# Patient Record
Sex: FEMALE | Race: OTHER | HISPANIC OR LATINO | URBAN - METROPOLITAN AREA
[De-identification: names, ages, dates, MRNs, and addresses within clinical notes are randomized per-mention and may not be internally consistent; named-entity substitution may affect disease eponyms.]

---

## 2021-10-29 ENCOUNTER — APPOINTMENT (RX ONLY)
Dept: URBAN - METROPOLITAN AREA CLINIC 15 | Facility: CLINIC | Age: 71
Setting detail: DERMATOLOGY
End: 2021-10-29

## 2021-10-29 DIAGNOSIS — Z41.9 ENCOUNTER FOR PROCEDURE FOR PURPOSES OTHER THAN REMEDYING HEALTH STATE, UNSPECIFIED: ICD-10-CM

## 2021-10-29 PROCEDURE — ? BOTOX

## 2021-10-29 PROCEDURE — ? ADDITIONAL NOTES

## 2021-10-29 PROCEDURE — ? COSMETIC CONSULTATION: BOTOX

## 2021-10-29 NOTE — PROCEDURE: BOTOX
Nasal Root Units: 0
Show Glabellar Units: Yes
Forehead Units: 4
Consent: Written consent obtained. Risks include but not limited to lid/brow ptosis, bruising, swelling, diplopia, temporary effect, incomplete chemical denervation.
Additional Area 5 Location: infraorbital
Additional Area 2 Location: chin
Show Lcl Units: No
Dilution (U/0.1 Cc): 1
Expiration Date (Month Year): 2/24
Glabellar Complex Units: [address] [id number]
Detail Level: Zone
Additional Area 4 Location: temporal scalp
Post-Care Instructions: Patient instructed to not lie down for 4 hours and limit physical activity for 24 hours. Patient instructed not to travel by airplane for 48 hours.
Lot #: [id number]
Price (Use Numbers Only, No Special Characters Or $): [address] [facility]
Periorbital Skin Units: Christelle
Additional Area 6 Location: periorbital